# Patient Record
Sex: FEMALE | Race: OTHER | HISPANIC OR LATINO
[De-identification: names, ages, dates, MRNs, and addresses within clinical notes are randomized per-mention and may not be internally consistent; named-entity substitution may affect disease eponyms.]

---

## 2020-08-20 PROBLEM — Z00.00 ENCOUNTER FOR PREVENTIVE HEALTH EXAMINATION: Status: ACTIVE | Noted: 2020-08-20

## 2020-08-21 ENCOUNTER — APPOINTMENT (OUTPATIENT)
Dept: SURGERY | Facility: CLINIC | Age: 40
End: 2020-08-21
Payer: MEDICAID

## 2020-08-21 VITALS
WEIGHT: 129 LBS | SYSTOLIC BLOOD PRESSURE: 114 MMHG | DIASTOLIC BLOOD PRESSURE: 77 MMHG | TEMPERATURE: 97.7 F | OXYGEN SATURATION: 95 % | HEART RATE: 69 BPM | HEIGHT: 61 IN | BODY MASS INDEX: 24.35 KG/M2

## 2020-08-21 DIAGNOSIS — Z78.9 OTHER SPECIFIED HEALTH STATUS: ICD-10-CM

## 2020-08-21 DIAGNOSIS — Z80.9 FAMILY HISTORY OF MALIGNANT NEOPLASM, UNSPECIFIED: ICD-10-CM

## 2020-08-21 PROCEDURE — 99244 OFF/OP CNSLTJ NEW/EST MOD 40: CPT

## 2020-08-21 RX ORDER — ZINC OXIDE 13 %
CREAM (GRAM) TOPICAL
Refills: 0 | Status: ACTIVE | COMMUNITY

## 2020-08-21 RX ORDER — LORATADINE 10 MG/1
10 TABLET ORAL
Refills: 0 | Status: ACTIVE | COMMUNITY

## 2020-08-21 NOTE — HISTORY OF PRESENT ILLNESS
[de-identified] : The patient comes to the office in consultation by Dr. Elvin Madison for evaluation of a recurrent umbilical hernia.  The patient reports that she had an umbilical hernia repaired about 3 to 4 years ago without a mesh and it has now recurred.  She states that she has intermittent pain with exertion.  she is planning to undergo an abdominoplasty and wishes to have the hernia repaired at the same setting.

## 2020-08-21 NOTE — CONSULT LETTER
[Dear  ___] : Dear  [unfilled], [Consult Letter:] : I had the pleasure of evaluating your patient, [unfilled]. [Consult Closing:] : Thank you very much for allowing me to participate in the care of this patient.  If you have any questions, please do not hesitate to contact me. [Sincerely,] : Sincerely, [FreeTextEntry3] : Tay Kaur MD, FACS\par  of Surgery\par [facility]\par

## 2020-08-21 NOTE — ASSESSMENT
[FreeTextEntry1] : The patient is a 40 year old female with a recurrent umbilical hernia that she wishes to have repaired concomitantly with her abdominoplasty.  The risks, benefits, and alternatives including the option of doing nothing to an open recurrent umbilical hernia repair with probable mesh were discussed.  The potential complications including but not limited to infection, bleeding, hernia recurrence, chronic post-operative pain, and seroma formation were discussed.  The patient was educated regarding the signs and symptoms of hernia strangulation and advised to seek immediate MD evaluation should this occur.  The patient understands and wishes to proceed at the next available time once coordinated with Dr. Elvin Madison.  \par \par

## 2020-08-21 NOTE — PHYSICAL EXAM
[JVD] : no jugular venous distention  [Purpura] : no purpura  [Petechiae] : no petechiae [No Rash or Lesion] : No rash or lesion [Skin Ulcer] : no ulcer [Skin Induration] : no induration [Oriented to Place] : oriented to place [Alert] : alert [Oriented to Person] : oriented to person [Calm] : calm [Oriented to Time] : oriented to time [de-identified] : no audible wheezing or stridor  [de-identified] : NC/AT PERRL EOMI no scleral icterus  [de-identified] : non toxic, in no acute distress  [de-identified] : trachea midline, no gross mass  [de-identified] : FROM of all extremities with no gross deformity or angulation there is a small reducible umbilical hernia, no incisional hernia  [de-identified] : large left paramedian scar consistent with prior trauma laparotomy  [de-identified] : mildly obese soft, no localizing tenderness, no guarding, no rebound, no masses  [de-identified] : mood is calm

## 2020-11-12 ENCOUNTER — FORM ENCOUNTER (OUTPATIENT)
Age: 40
End: 2020-11-12

## 2021-01-04 ENCOUNTER — TRANSCRIPTION ENCOUNTER (OUTPATIENT)
Age: 41
End: 2021-01-04

## 2021-02-09 ENCOUNTER — TRANSCRIPTION ENCOUNTER (OUTPATIENT)
Age: 41
End: 2021-02-09

## 2021-03-26 ENCOUNTER — TRANSCRIPTION ENCOUNTER (OUTPATIENT)
Age: 41
End: 2021-03-26

## 2021-06-28 ENCOUNTER — TRANSCRIPTION ENCOUNTER (OUTPATIENT)
Age: 41
End: 2021-06-28

## 2021-09-21 ENCOUNTER — TRANSCRIPTION ENCOUNTER (OUTPATIENT)
Age: 41
End: 2021-09-21

## 2021-10-21 ENCOUNTER — TRANSCRIPTION ENCOUNTER (OUTPATIENT)
Age: 41
End: 2021-10-21

## 2021-12-03 ENCOUNTER — APPOINTMENT (OUTPATIENT)
Dept: SURGERY | Facility: CLINIC | Age: 41
End: 2021-12-03

## 2021-12-30 ENCOUNTER — TRANSCRIPTION ENCOUNTER (OUTPATIENT)
Age: 41
End: 2021-12-30

## 2022-10-17 ENCOUNTER — APPOINTMENT (OUTPATIENT)
Dept: SURGERY | Facility: CLINIC | Age: 42
End: 2022-10-17

## 2022-10-17 VITALS
SYSTOLIC BLOOD PRESSURE: 144 MMHG | BODY MASS INDEX: 24.17 KG/M2 | TEMPERATURE: 97.1 F | HEIGHT: 61 IN | RESPIRATION RATE: 14 BRPM | DIASTOLIC BLOOD PRESSURE: 78 MMHG | HEART RATE: 68 BPM | OXYGEN SATURATION: 99 % | WEIGHT: 128 LBS

## 2022-10-17 PROCEDURE — 99214 OFFICE O/P EST MOD 30 MIN: CPT

## 2022-10-17 NOTE — PHYSICAL EXAM
[JVD] : no jugular venous distention  [No Rash or Lesion] : No rash or lesion [Purpura] : no purpura  [Petechiae] : no petechiae [Skin Ulcer] : no ulcer [Skin Induration] : no induration [Alert] : alert [Oriented to Person] : oriented to person [Oriented to Place] : oriented to place [Oriented to Time] : oriented to time [Calm] : calm [de-identified] : non toxic, in no acute distress  [de-identified] : NC/AT PERRL EOMI no scleral icterus  [de-identified] : trachea midline, no gross mass  [de-identified] : no audible wheezing or stridor  [de-identified] : mildly obese soft, no localizing tenderness, no guarding, no rebound, no masses  [de-identified] : FROM of all extremities with no gross deformity or angulation,  there is a reducible recurrent umbilical hernia, no incisional hernia  [de-identified] : large left paramedian scar consistent with prior trauma laparotomy, there is a hypertrophic lower abdominoplasty scar seen   [de-identified] : mood is calm

## 2022-10-17 NOTE — ASSESSMENT
[FreeTextEntry1] : The patient is a 42 year old female with a recurrent umbilical hernia. She has been advised to obtain copies of her op reports from the hernia repair and abdominoplasty.  She will need an abd/pel CT scan to assess the hernia.  Options for repair will follow review of the imaging and operative records.  A total of 30 minutes was spent coordinating the patient's care.

## 2022-10-17 NOTE — HISTORY OF PRESENT ILLNESS
[de-identified] : The patient returns to the office with complaints that her belly button has popped again.  She reports that she underwent an abdominoplasty and umbilical hernia in [place] in 2020.  The patient reports that one month post op the belly button popped again.  She states that now it is popping out more and more.  She is having more discomfort with activity.  She is no longer smoking having stopped in 2020.

## 2022-11-28 ENCOUNTER — APPOINTMENT (OUTPATIENT)
Dept: SURGERY | Facility: CLINIC | Age: 42
End: 2022-11-28

## 2022-11-28 VITALS
BODY MASS INDEX: 25.68 KG/M2 | HEART RATE: 75 BPM | SYSTOLIC BLOOD PRESSURE: 105 MMHG | WEIGHT: 136 LBS | DIASTOLIC BLOOD PRESSURE: 70 MMHG | RESPIRATION RATE: 14 BRPM | HEIGHT: 61 IN | OXYGEN SATURATION: 98 % | TEMPERATURE: 97.5 F

## 2022-11-28 DIAGNOSIS — K42.9 UMBILICAL HERNIA W/OUT OBSTRUCTION OR GANGRENE: ICD-10-CM

## 2022-11-28 DIAGNOSIS — Z01.818 ENCOUNTER FOR OTHER PREPROCEDURAL EXAMINATION: ICD-10-CM

## 2022-11-28 PROCEDURE — 99214 OFFICE O/P EST MOD 30 MIN: CPT

## 2022-11-28 NOTE — PHYSICAL EXAM
[JVD] : no jugular venous distention  [No Rash or Lesion] : No rash or lesion [Purpura] : no purpura  [Petechiae] : no petechiae [Skin Ulcer] : no ulcer [Skin Induration] : no induration [Alert] : alert [Oriented to Person] : oriented to person [Oriented to Place] : oriented to place [Oriented to Time] : oriented to time [Calm] : calm [de-identified] : non toxic, in no acute distress  [de-identified] : NC/AT PERRL EOMI no scleral icterus  [de-identified] : trachea midline, no gross mass  [de-identified] : no audible wheezing or stridor  [de-identified] : mildly obese soft, no localizing tenderness, no guarding, no rebound, no masses  [de-identified] : FROM of all extremities with no gross deformity or angulation,  there remains a reducible recurrent umbilical hernia, no incisional hernia  [de-identified] : large left paramedian scar consistent with prior trauma laparotomy, there is a hypertrophic lower abdominoplasty scar seen   [de-identified] : mood is calm

## 2022-11-28 NOTE — ASSESSMENT
[FreeTextEntry1] : The patient is a 42 year old female with a recurrent umbilical hernia.  The patient has been advised that she will benefit from weight reduction prior to repair of the hernia.  The patient has been advised that weight loss will be an important adjunct to help potentially reduce the risks of infection, hernia recurrence, and chronic post operative pain associated with surgery by potentially reducing the tension along the abdominal wall. The patient will follow up in 3 months time to assess her progress and revaluate the hernia for stability.   A total of 30 minutes was spent coordinating the patient's care.

## 2022-11-28 NOTE — DATA REVIEWED
[FreeTextEntry1] : Independent review of the abd/pel CT scan reveals a recurrent fat containing umbilical hernia, no bowel involvement or obstruction is seen.

## 2022-11-28 NOTE — HISTORY OF PRESENT ILLNESS
[de-identified] : The patient returns to the office with no new complaints. She has gained about 10 pounds since her last visit.  She reports that she is in no pain but wishes to have the hernia repaired.

## 2023-01-08 ENCOUNTER — NON-APPOINTMENT (OUTPATIENT)
Age: 43
End: 2023-01-08